# Patient Record
Sex: FEMALE | Race: ASIAN | NOT HISPANIC OR LATINO | URBAN - METROPOLITAN AREA
[De-identification: names, ages, dates, MRNs, and addresses within clinical notes are randomized per-mention and may not be internally consistent; named-entity substitution may affect disease eponyms.]

---

## 2020-07-02 ENCOUNTER — OFFICE VISIT (OUTPATIENT)
Dept: URBAN - METROPOLITAN AREA CLINIC 33 | Facility: CLINIC | Age: 50
End: 2020-07-02

## 2020-07-02 VITALS
DIASTOLIC BLOOD PRESSURE: 62 MMHG | HEIGHT: 62 IN | BODY MASS INDEX: 22.45 KG/M2 | WEIGHT: 122 LBS | TEMPERATURE: 97 F | SYSTOLIC BLOOD PRESSURE: 100 MMHG

## 2020-07-02 PROBLEM — 275978004 SCREENING FOR MALIGNANT NEOPLASM OF COLON: Status: ACTIVE | Noted: 2020-07-02

## 2020-07-02 RX ORDER — MULTIVIT-MIN/IRON/FOLIC ACID/K 18-600-40
AS DIRECTED CAPSULE ORAL
Status: ACTIVE | COMMUNITY

## 2020-07-02 RX ORDER — ACETAMINOPHEN 500 MG
AS DIRECTED TABLET ORAL
Status: ACTIVE | COMMUNITY

## 2020-07-02 RX ORDER — CALCIUM CARBONATE 500(1250)
1 TABLET WITH MEALS TABLET ORAL TWICE A DAY
Qty: 60 | Status: ACTIVE | COMMUNITY

## 2020-07-02 RX ORDER — MULTIVITAMIN
1 TABLET TABLET ORAL ONCE A DAY
Qty: 30 | Status: ACTIVE | COMMUNITY

## 2020-07-02 RX ORDER — SODIUM PICOSULFATE, MAGNESIUM OXIDE, AND ANHYDROUS CITRIC ACID 10; 3.5; 12 MG/160ML; G/160ML; G/160ML
AS DIRECTED LIQUID ORAL
Qty: 1 KIT | OUTPATIENT
Start: 2020-07-02

## 2020-07-02 NOTE — HPI-MIGRATED HPI
;     Abnormal CT Scan :                          50 year old female patient presents for abnormal CT scan. Patient admits she visited [place] [facility] on 06/25 for abdominal pain consult . She admits she began having severe abdominal pain , she denies eating outside food . Pt had CT completed with results noted below . She was given morphine while in the ER and prescribed Dicyclomine , ondansetron and Levofloxacin x 3 days. She denies any abdominal pain at this time . She does admits associated nausea . Patient admits having associated constipation since last week after ER visit . Patient admits stools are pebble like . SHe denies any rectal bleeding .   Outside Records : CT Abdomen + Pelvis  (06/25/2020) IMPRESSION: Wall thickening of the ascending colon with moderaate adjacent inflammation and fluid concerning for focal colitis .  Colonic diverticulosis without inflamed diverticulum to suggest acute diverticulitis . Normal apendix . ;

## 2020-07-02 NOTE — EXAM-MIGRATED EXAMINATIONS
GENERAL APPEARANCE: - alert, in no acute distress, well developed, well nourished;   HEAD: - normocephalic, atraumatic;   EYES: - sclera anicteric bilaterally;   EARS: - normal;   ORAL CAVITY: - mucosa moist;   THROAT: - clear  Mallampati class II;   HEART: - no murmurs, regular rate and rhythm, S1, S2 normal;   LUNGS: - clear to auscultation bilaterally, good air movement, no wheezes, rales, rhonchi;   ABDOMEN: - bowel sounds present, no masses palpable, no organomegaly , no rebound tenderness, soft, nontender, nondistended;

## 2020-07-29 ENCOUNTER — OFFICE VISIT (OUTPATIENT)
Dept: URBAN - METROPOLITAN AREA CLINIC 35 | Facility: CLINIC | Age: 50
End: 2020-07-29

## 2020-08-01 ENCOUNTER — TELEPHONE ENCOUNTER (OUTPATIENT)
Dept: URBAN - METROPOLITAN AREA CLINIC 13 | Facility: CLINIC | Age: 50
End: 2020-08-01

## 2020-08-01 RX ORDER — SODIUM PICOSULFATE, MAGNESIUM OXIDE, AND ANHYDROUS CITRIC ACID 10; 3.5; 12 MG/160ML; G/160ML; G/160ML
AS DIRECTED LIQUID ORAL
Qty: 1 KIT
Start: 2020-07-02

## 2020-08-04 ENCOUNTER — OFFICE VISIT (OUTPATIENT)
Dept: URBAN - METROPOLITAN AREA SURGERY CENTER 8 | Facility: SURGERY CENTER | Age: 50
End: 2020-08-04

## 2020-08-18 ENCOUNTER — OFFICE VISIT (OUTPATIENT)
Dept: URBAN - METROPOLITAN AREA CLINIC 35 | Facility: CLINIC | Age: 50
End: 2020-08-18

## 2020-08-19 ENCOUNTER — TELEPHONE ENCOUNTER (OUTPATIENT)
Dept: URBAN - METROPOLITAN AREA CLINIC 35 | Facility: CLINIC | Age: 50
End: 2020-08-19

## 2020-08-19 ENCOUNTER — DASHBOARD ENCOUNTERS (OUTPATIENT)
Age: 50
End: 2020-08-19

## 2020-08-19 ENCOUNTER — OFFICE VISIT (OUTPATIENT)
Dept: URBAN - METROPOLITAN AREA CLINIC 37 | Facility: CLINIC | Age: 50
End: 2020-08-19

## 2020-08-19 VITALS — WEIGHT: 125 LBS | HEIGHT: 62 IN | BODY MASS INDEX: 23 KG/M2

## 2020-08-19 PROBLEM — 79922009 EPIGASTRIC PAIN: Status: ACTIVE | Noted: 2020-08-19

## 2020-08-19 PROBLEM — 68496003 POLYP OF COLON: Status: ACTIVE | Noted: 2020-08-19

## 2020-08-19 PROBLEM — 102614006 GENERALIZED ABDOMINAL PAIN: Status: ACTIVE | Noted: 2020-08-19

## 2020-08-19 PROBLEM — 92448001 BENIGN NEOPLASM OF TRANSVERSE COLON: Status: ACTIVE | Noted: 2020-08-19

## 2020-08-19 PROBLEM — 398050005 DIVERTICULAR DISEASE OF COLON: Status: ACTIVE | Noted: 2020-07-02

## 2020-08-19 PROBLEM — 301754002 RIGHT LOWER QUADRANT PAIN: Status: ACTIVE | Noted: 2020-07-02

## 2020-08-19 PROBLEM — 249504006 FLATULENCE: Status: ACTIVE | Noted: 2020-07-02

## 2020-08-19 RX ORDER — MULTIVIT-MIN/IRON/FOLIC ACID/K 18-600-40
AS DIRECTED CAPSULE ORAL
Status: ACTIVE | COMMUNITY

## 2020-08-19 RX ORDER — DICYCLOMINE HYDROCHLORIDE 20 MG/1
1 TABLET TABLET ORAL
Qty: 30 | Refills: 1 | OUTPATIENT
Start: 2020-08-19

## 2020-08-19 RX ORDER — MULTIVITAMIN
1 TABLET TABLET ORAL ONCE A DAY
Qty: 30 | Status: ACTIVE | COMMUNITY

## 2020-08-19 RX ORDER — CALCIUM CARBONATE 500(1250)
1 TABLET WITH MEALS TABLET ORAL TWICE A DAY
Qty: 60 | Status: ACTIVE | COMMUNITY

## 2020-08-19 RX ORDER — ACETAMINOPHEN 500 MG
AS DIRECTED TABLET ORAL
Status: ACTIVE | COMMUNITY

## 2020-08-19 RX ORDER — SODIUM PICOSULFATE, MAGNESIUM OXIDE, AND ANHYDROUS CITRIC ACID 10; 3.5; 12 MG/160ML; G/160ML; G/160ML
AS DIRECTED LIQUID ORAL
Qty: 1 KIT | Status: DISCONTINUED | COMMUNITY
Start: 2020-07-02

## 2020-08-19 NOTE — HPI-MIGRATED HPI
;     Abnormal CT Scan :                      Patient presents for follow up of abnormal CT scan. Patient had colonoscopy completed on 08/04 with results noted below. Pt denies symptoms have improved since last visit . She denies constipation.   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  Pathology report of sigmoid polyp report showed findings of benign colonic mucosa with lymphoid aggregate. No diagnostic evidence of hyperplastic or adenomatous epithelial changes.     Of last visit (07/02/2020)  50 year old female patient presents for abnormal CT scan. Patient admits she visited [place] ER on 06/25 for abdominal pain consult . She admits she began having severe abdominal pain , she denies eating outside food . Pt had CT completed with results noted below . She was given morphine while in the ER and prescribed Dicyclomine , ondansetron and Levofloxacin x 3 days. She denies any abdominal pain at this time . She does admits associated nausea . Patient admits having associated constipation since last week after ER visit . Patient admits stools are pebble like . SHe denies any rectal bleeding .   Outside Records : CT Abdomen + Pelvis  (06/25/2020) IMPRESSION: Wall thickening of the ascending colon with moderaate adjacent inflammation and fluid concerning for focal colitis .  Colonic diverticulosis without inflamed diverticulum to suggest acute diverticulitis . Normal apendix . ;